# Patient Record
Sex: FEMALE | Race: WHITE | NOT HISPANIC OR LATINO | Employment: FULL TIME | URBAN - METROPOLITAN AREA
[De-identification: names, ages, dates, MRNs, and addresses within clinical notes are randomized per-mention and may not be internally consistent; named-entity substitution may affect disease eponyms.]

---

## 2018-02-13 ENCOUNTER — HOSPITAL ENCOUNTER (OUTPATIENT)
Dept: LAB | Facility: HOSPITAL | Age: 58
Discharge: HOME | End: 2018-02-13
Attending: INTERNAL MEDICINE | Admitting: INTERNAL MEDICINE

## 2018-02-13 LAB
ALBUMIN SERPL-MCNC: 4.1 G/DL (ref 3.5–4.8)
ALBUMIN/GLOB SERPL: 1.5 {RATIO} (ref 1–1.7)
ALP SERPL-CCNC: 48 IU/L (ref 32–91)
ALT SERPL-CCNC: 14 IU/L (ref 14–54)
ANION GAP SERPL CALC-SCNC: 9.6 MMOL/L (ref 10–20)
AST SERPL-CCNC: 16 IU/L (ref 15–41)
BILIRUB SERPL-MCNC: 0.5 MG/DL (ref 0.3–1.2)
BUN SERPL-MCNC: 10 MG/DL (ref 8–20)
BUN/CREAT SERPL: 12.5 (ref 5.4–26.2)
CALCIUM SERPL-MCNC: 9.5 MG/DL (ref 8.9–10.3)
CHLORIDE SERPL-SCNC: 105 MMOL/L (ref 101–111)
CONV CO2: 29 MMOL/L (ref 22–32)
CONV TOTAL PROTEIN: 6.9 G/DL (ref 6.1–7.9)
CREAT UR-MCNC: 0.8 MG/DL (ref 0.4–1)
GLOBULIN UR ELPH-MCNC: 2.8 G/DL (ref 2.5–3.8)
GLUCOSE SERPL-MCNC: 98 MG/DL (ref 65–99)
MAGNESIUM SERPL-MCNC: 2.1 MG/DL (ref 1.8–2.5)
POTASSIUM SERPL-SCNC: 3.6 MMOL/L (ref 3.6–5.1)
SODIUM SERPL-SCNC: 140 MMOL/L (ref 136–144)

## 2018-02-27 ENCOUNTER — HOSPITAL ENCOUNTER (OUTPATIENT)
Dept: CARDIOLOGY | Facility: HOSPITAL | Age: 58
Discharge: HOME | End: 2018-02-27
Attending: INTERNAL MEDICINE | Admitting: INTERNAL MEDICINE

## 2018-03-08 ENCOUNTER — HOSPITAL ENCOUNTER (OUTPATIENT)
Dept: MAMMOGRAPHY | Facility: HOSPITAL | Age: 58
Discharge: HOME | End: 2018-03-08
Attending: OBSTETRICS & GYNECOLOGY | Admitting: OBSTETRICS & GYNECOLOGY

## 2018-03-14 ENCOUNTER — HOSPITAL ENCOUNTER (OUTPATIENT)
Dept: FAMILY MEDICINE CLINIC | Facility: CLINIC | Age: 58
Setting detail: SPECIMEN
Discharge: HOME | End: 2018-03-14
Attending: FAMILY MEDICINE | Admitting: FAMILY MEDICINE

## 2018-08-09 ENCOUNTER — HOSPITAL ENCOUNTER (OUTPATIENT)
Dept: CT IMAGING | Facility: HOSPITAL | Age: 58
Discharge: HOME | End: 2018-08-09
Attending: FAMILY MEDICINE | Admitting: FAMILY MEDICINE

## 2018-08-14 ENCOUNTER — HOSPITAL ENCOUNTER (OUTPATIENT)
Dept: CT IMAGING | Facility: HOSPITAL | Age: 58
Discharge: HOME | End: 2018-08-14
Attending: FAMILY MEDICINE | Admitting: FAMILY MEDICINE

## 2018-09-17 ENCOUNTER — HOSPITAL ENCOUNTER (OUTPATIENT)
Dept: MRI IMAGING | Facility: HOSPITAL | Age: 58
Discharge: HOME | End: 2018-09-17
Attending: OBSTETRICS & GYNECOLOGY | Admitting: OBSTETRICS & GYNECOLOGY

## 2018-09-17 LAB — CREAT BLDA-MCNC: 0.9 MG/DL (ref 0.6–1.3)

## 2018-10-01 ENCOUNTER — HOSPITAL ENCOUNTER (OUTPATIENT)
Dept: CARDIOLOGY | Facility: HOSPITAL | Age: 58
Discharge: HOME | End: 2018-10-01
Attending: INTERNAL MEDICINE | Admitting: INTERNAL MEDICINE

## 2018-10-04 ENCOUNTER — HOSPITAL ENCOUNTER (OUTPATIENT)
Dept: NUCLEAR MEDICINE | Facility: HOSPITAL | Age: 58
Discharge: HOME | End: 2018-10-04
Attending: FAMILY MEDICINE | Admitting: FAMILY MEDICINE

## 2018-10-25 ENCOUNTER — HOSPITAL ENCOUNTER (OUTPATIENT)
Dept: MRI IMAGING | Facility: HOSPITAL | Age: 58
Discharge: HOME | End: 2018-10-25
Attending: FAMILY MEDICINE | Admitting: FAMILY MEDICINE

## 2018-11-01 ENCOUNTER — HOSPITAL ENCOUNTER (OUTPATIENT)
Dept: ORTHOPEDIC SURGERY | Facility: CLINIC | Age: 58
Discharge: HOME | End: 2018-11-01
Attending: ORTHOPAEDIC SURGERY | Admitting: ORTHOPAEDIC SURGERY

## 2018-11-08 ENCOUNTER — HOSPITAL ENCOUNTER (OUTPATIENT)
Dept: MRI IMAGING | Facility: HOSPITAL | Age: 58
Discharge: HOME | End: 2018-11-08
Attending: ORTHOPAEDIC SURGERY | Admitting: ORTHOPAEDIC SURGERY

## 2018-11-21 ENCOUNTER — HOSPITAL ENCOUNTER (OUTPATIENT)
Dept: PHYSICAL THERAPY | Facility: HOSPITAL | Age: 58
Setting detail: RECURRING SERIES
Discharge: HOME | End: 2018-12-31
Attending: ORTHOPAEDIC SURGERY | Admitting: ORTHOPAEDIC SURGERY

## 2019-03-18 ENCOUNTER — HOSPITAL ENCOUNTER (OUTPATIENT)
Dept: MAMMOGRAPHY | Facility: HOSPITAL | Age: 59
Discharge: HOME | End: 2019-03-18
Attending: OBSTETRICS & GYNECOLOGY | Admitting: OBSTETRICS & GYNECOLOGY

## 2019-10-21 ENCOUNTER — TELEPHONE (OUTPATIENT)
Dept: FAMILY MEDICINE CLINIC | Facility: CLINIC | Age: 59
End: 2019-10-21

## 2019-10-21 NOTE — TELEPHONE ENCOUNTER
Patient is wanting to be worked in on Thursday before 2pm.  When I asked her for the reason, she declined to tell me.  Said it has to do with Ag.  Can you work her in?

## 2019-10-24 ENCOUNTER — OFFICE VISIT (OUTPATIENT)
Dept: FAMILY MEDICINE CLINIC | Facility: CLINIC | Age: 59
End: 2019-10-24

## 2019-10-24 VITALS
HEART RATE: 53 BPM | BODY MASS INDEX: 26.63 KG/M2 | TEMPERATURE: 98.2 F | RESPIRATION RATE: 16 BRPM | SYSTOLIC BLOOD PRESSURE: 148 MMHG | OXYGEN SATURATION: 98 % | HEIGHT: 64 IN | DIASTOLIC BLOOD PRESSURE: 80 MMHG | WEIGHT: 156 LBS

## 2019-10-24 DIAGNOSIS — F43.21 COMPLICATED GRIEF: Primary | ICD-10-CM

## 2019-10-24 PROBLEM — R07.89 CHEST PAIN, ATYPICAL: Status: ACTIVE | Noted: 2018-02-13

## 2019-10-24 PROBLEM — F32.A DEPRESSION: Status: ACTIVE | Noted: 2018-02-13

## 2019-10-24 PROBLEM — K21.9 GERD (GASTROESOPHAGEAL REFLUX DISEASE): Status: ACTIVE | Noted: 2017-01-03

## 2019-10-24 PROBLEM — M15.9 POLYOSTEOARTHRITIS: Status: ACTIVE | Noted: 2018-08-09

## 2019-10-24 PROBLEM — R00.2 PALPITATIONS: Status: ACTIVE | Noted: 2017-01-03

## 2019-10-24 PROCEDURE — 99213 OFFICE O/P EST LOW 20 MIN: CPT | Performed by: FAMILY MEDICINE

## 2019-10-24 RX ORDER — ALPRAZOLAM 0.5 MG/1
0.5 TABLET ORAL EVERY 6 HOURS PRN
Qty: 50 TABLET | Refills: 2 | Status: SHIPPED | OUTPATIENT
Start: 2019-10-24 | End: 2021-11-29

## 2019-10-24 RX ORDER — ATENOLOL 25 MG/1
TABLET ORAL EVERY 24 HOURS
COMMUNITY
Start: 2018-12-04 | End: 2020-12-28

## 2019-10-24 RX ORDER — CITALOPRAM 10 MG/1
TABLET ORAL
Qty: 40 TABLET | Refills: 6 | Status: SHIPPED | OUTPATIENT
Start: 2019-10-24 | End: 2019-12-11

## 2019-10-24 RX ORDER — ACYCLOVIR 800 MG/1
TABLET ORAL
Refills: 0 | COMMUNITY
Start: 2019-10-14

## 2019-10-24 NOTE — PATIENT INSTRUCTIONS
Coping With Loss, Adult  People experience loss in many different ways throughout their lives. Events such as moving, changing jobs, and losing friends can create a sense of loss. The loss may be as serious as a major health change, divorce, death of a pet, or death of a loved one. All of these types of loss are likely to create a physical and emotional reaction known as grief. Grief is the result of a major change or an absence of something or someone that you count on. Grief is a normal reaction to loss.  How to recognize changes  A variety of factors can affect your grieving experience, including:  · The nature of your loss.  · Your relationship to what or whom you lost.  · Your understanding of grief and how to cope with it.  · Your support system.  The way that you deal with your grief will affect your ability to function as you normally do. When you are grieving, you may experience:  · Numbness, shock, sadness, anxiety, anger, denial, and guilt.  · Thoughts about death.  · Unexpected crying.  · A physical sensation of emptiness in your gut.  · Problems sleeping and eating.  · Fatigue.  · Loss of interest in normal activities.  · Dreaming about or imagining seeing the person who .  · A need to remember what or whom you lost.  · Difficulty thinking about anything other than your loss for a period of time.  · Relief. If you have been expecting the loss for a while, you may feel a sense of relief when it happens.  Where to find support  To get support for coping with loss:  · Ask your health care provider for help and recommendations, such as grief counseling or therapy.  · Think about joining a support group for people who are coping with loss.  Follow these instructions at home:    · Be patient with yourself and others. Allow the grieving process to happen, and remember that grieving takes time.  ? It is likely that you may never feel completely done with some grief. You may find a way to move on while still  cherishing memories and feelings about your loss.  ? Accepting your loss is a process. It can take months or longer to adjust.  · Express your feelings in healthy ways, such as:  ? Talking with others about your loss. It may be helpful to find others who have had a similar loss, such as a support group.  ? Writing down your feelings in a journal.  ? Doing physical activities to release stress and emotional energy.  ? Doing creative activities like painting, sculpting, or playing or listening to music.  ? Practicing resilience. This is the ability to recover and adjust after facing challenges. Reading some resources that encourage resilience may help you to learn ways to practice those behaviors.  · Keep to your normal routine as much as possible. If you have trouble focusing or doing normal activities, it is acceptable to take some time away from your normal routine.  · Spend time with friends and loved ones.  · Eat a healthy diet, get plenty of sleep, and rest when you feel tired.  Where to find more information  You can find more information about coping with loss from:  · American Society of Clinical Oncology: www.cancer.net  · American Psychological Association: www.apa.org  Contact a health care provider if:  · Your grief is extreme and keeps getting worse.  · You have ongoing grief that does not improve.  · Your body shows symptoms of grief, such as illness.  · You feel depressed, anxious, or lonely.  Get help right away if:  · You have thoughts about hurting yourself or others.  If you ever feel like you may hurt yourself or others, or have thoughts about taking your own life, get help right away. You can go to your nearest emergency department or call:  · Your local emergency services (911 in the [place]  · A suicide crisis helpline, such as the National Suicide Prevention Lifeline at 1-882.779.1822. This is open 24 hours a day.  Summary  · Grief is a normal part of experiencing a loss. It is the result of a  major change or an absence of something or someone that you count on.  · The depth of grief and the period of recovery depend on the type of loss as well as your ability to adjust to the change and process your feelings.  · Processing grief requires patience and a willingness to accept your feelings and talk about your loss with people who are supportive.  · It is important to find resources that work for you and to realize that we are all different when it comes to grief. There is not one single grieving process that works for everyone in the same way.  · Be aware that when grief becomes extreme, it can lead to more severe issues like isolation, depression, anxiety, or suicidal thoughts. Talk with your health care provider if you have any of these issues.  This information is not intended to replace advice given to you by your health care provider. Make sure you discuss any questions you have with your health care provider.  Document Released: 05/03/2018 Document Revised: 05/03/2018 Document Reviewed: 05/03/2018  [employer] Interactive Patient Education © 2019 [employer]  Complicated Grief  Grief is a normal response to the death of someone close to you. Feelings of fear, anger, and guilt can affect almost everyone who loses a loved one. It is also common to have symptoms of depression while you are grieving. These include problems with sleep, loss of appetite, and lack of energy. They may last for weeks or months after a loss.  Complicated grief is different from normal grief or depression. Normal grieving involves sadness and feelings of loss, but those feelings get better and heal over time. Complicated grief is a severe type of grief that lasts for a long time, usually for several months to a year or longer. It interferes with your ability to function normally. Complicated grief may require treatment from a mental health care provider.  What are the causes?  The cause of this condition is not known. It is not  clear why some people continue to struggle with grief and others do not.  What increases the risk?  You are more likely to develop this condition if:  · The death of your loved one was sudden or unexpected.  · The death of your loved one was due to a violent event.  · Your loved one  from suicide.  · Your loved one was a child or a young person.  · You were very close to your loved one, or you were dependent on him or her.  · You have a history of depression or anxiety.  What are the signs or symptoms?  Symptoms of this condition include:  · Feeling disbelief or having a lack of emotion (numbness).  · Being unable to enjoy good memories of your loved one.  · Needing to avoid anything or anyone that reminds you of your loved one.  · Being unable to stop thinking about the death.  · Feeling intense anger or guilt.  · Feeling alone and hopeless.  · Feeling that your life is meaningless and empty.  · Losing the desire to move on with your life.  How is this diagnosed?  This condition may be diagnosed based on:  · Your symptoms. Complicated grief will be diagnosed if you have ongoing symptoms of grief for 6-12 months or longer.  · The effect of symptoms on your life. You may be diagnosed with this condition if your symptoms are interfering with your ability to live your life.  Your health care provider may recommend that you see a mental health care provider. Many symptoms of depression are similar to the symptoms of complicated grief. It is important to be evaluated for complicated grief along with other mental health conditions.  How is this treated?  This condition is most commonly treated with talk therapy. This therapy is offered by a mental health specialist (psychiatrist). During therapy:  · You will learn healthy ways to cope with the loss of your loved one.  · Your mental health care provider may recommend antidepressant medicines.  Follow these instructions at home:  Lifestyle    · Take care of  yourself.  ? Eat on a regular basis, and maintain a healthy diet. Eat plenty of fruits, vegetables, lean protein, and whole grains.  ? Try to get some exercise each day. Aim for 30 minutes of exercise on most days of the week.  ? Keep a consistent sleep schedule. Try to get 8 or more hours of sleep each night.  ? Start doing the things that you used to enjoy.  · Do not use drugs or alcohol to ease your symptoms.  · Spend time with friends and loved ones.  General instructions  · Take over-the-counter and prescription medicines only as told by your health care provider.  · Consider joining a grief (bereavement) support group to help you deal with your loss.  · Keep all follow-up visits as told by your health care provider. This is important.  Contact a health care provider if:  · Your symptoms prevent you from functioning normally.  · Your symptoms do not get better with treatment.  Get help right away if:  · You have serious thoughts about hurting yourself or someone else.  · You have suicidal feelings.  If you ever feel like you may hurt yourself or others, or have thoughts about taking your own life, get help right away. You can go to your nearest emergency department or call:  · Your local emergency services (911 in the [place]  · A suicide crisis helpline, such as the National Suicide Prevention Lifeline at 1-594.324.8949. This is open 24 hours a day.  Summary  · Complicated grief is a severe type of grief that lasts for a long time. This grief is not likely to go away on its own. Get the help you need.  · Some griefs are more difficult than others and can cause this condition. You may need a certain type of treatment to help you recover if the loss of your loved one was sudden, violent, or due to suicide.  · You may feel guilty about moving on with your life. Getting help does not mean that you are forgetting your loved one. It means that you are taking care of yourself.  · Complicated grief is best treated with  talk therapy. Medicines may also be prescribed.  · Seek the help you need, and find support that will help you recover.  This information is not intended to replace advice given to you by your health care provider. Make sure you discuss any questions you have with your health care provider.  Document Released: 12/18/2006 Document Revised: 10/03/2018 Document Reviewed: 10/03/2018  [employer] Interactive Patient Education © 2019 [employer]

## 2019-10-27 PROBLEM — F43.21 COMPLICATED GRIEF: Status: ACTIVE | Noted: 2019-10-27

## 2019-10-27 NOTE — PROGRESS NOTES
Rooming Tab(CC,VS,Pt Hx,Fall Screen)  Chief Complaint   Patient presents with   • Depression       Subjective    Patient is here to discuss her severe grief/depression over the sudden loss of her  a few months ago.  Her  was a smoker and had an aneurysm in his aorta both in the thoracic and lumbar area.  Unfortunately he continued to smoke and was simply unable to stop.  Sondra came home from work one evening and found him down.  He had probably gone down several hours earlier and was dead on arrival.  She is obviously grief stricken.  She is very lonely.  The house is quiet.  The kids come over but they have trouble being in the same home that he was in.  Too many memories.  She is just wondering if there is anything at all that would help her get through this rough time            I have reviewed and updated her medications, medical history and problem list during today's office visit.     Patient Care Team:  Ezra Ramesh MD as PCP - General    Problem List Tab  Medications Tab  Synopsis Tab  Chart Review Tab  Care Everywhere Tab  Immunizations Tab  Patient History Tab    Social History     Tobacco Use   • Smoking status: Never Smoker   • Smokeless tobacco: Never Used   Substance Use Topics   • Alcohol use: No     Frequency: Never       Review of Systems   Constitutional: Positive for fatigue. Negative for diaphoresis and fever.   HENT: Negative.  Negative for congestion, hearing loss, sinus pressure, tinnitus and trouble swallowing.    Eyes: Negative.    Respiratory: Positive for chest tightness. Negative for cough, choking, shortness of breath and wheezing.    Cardiovascular: Positive for chest pain. Negative for palpitations.   Gastrointestinal: Negative.  Positive for indigestion. Negative for abdominal distention, abdominal pain and GERD.   Endocrine: Negative.    Genitourinary: Negative.  Negative for frequency.   Musculoskeletal: Negative.  Negative for arthralgias, gait problem, joint  "swelling, myalgias and neck stiffness.   Skin: Negative.  Negative for rash.   Allergic/Immunologic: Negative.  Negative for environmental allergies.   Neurological: Positive for headache. Negative for syncope and speech difficulty.   Hematological: Negative.  Negative for adenopathy.   Psychiatric/Behavioral: Positive for decreased concentration, dysphoric mood and sleep disturbance. Negative for stress.   All other systems reviewed and are negative.      Objective     Rooming Tab(CC,VS,Pt Hx,Fall Screen)  /80 (BP Location: Right arm, Cuff Size: Adult)   Pulse 53   Temp 98.2 °F (36.8 °C) (Oral)   Resp 16   Ht 162.6 cm (64\")   Wt 70.8 kg (156 lb)   SpO2 98%   BMI 26.78 kg/m²     Body mass index is 26.78 kg/m².    Physical Exam   Constitutional: She is oriented to person, place, and time. She appears well-developed and well-nourished.   HENT:   Head: Normocephalic and atraumatic.   Right Ear: External ear normal.   Left Ear: External ear normal.   Nose: Nose normal.   Mouth/Throat: Oropharynx is clear and moist. No oropharyngeal exudate.   Eyes: Conjunctivae and EOM are normal. Pupils are equal, round, and reactive to light. Right eye exhibits no discharge. Left eye exhibits no discharge. No scleral icterus.   Neck: Normal range of motion. Neck supple. No JVD present. No thyromegaly present.   Cardiovascular: Normal rate, regular rhythm, normal heart sounds and intact distal pulses.   No murmur heard.  Pulmonary/Chest: Effort normal and breath sounds normal. No respiratory distress. She has no wheezes. She has no rales. She exhibits no tenderness.   Abdominal: Soft. Bowel sounds are normal. She exhibits no distension. There is no tenderness.   Genitourinary: Rectal exam shows guaiac negative stool.   Musculoskeletal: Normal range of motion. She exhibits no edema, tenderness or deformity.   Lymphadenopathy:     She has no cervical adenopathy.   Neurological: She is alert and oriented to person, place, and " time.   Skin: Skin is warm and dry.   Psychiatric: She has a normal mood and affect. Her behavior is normal. Judgment and thought content normal.   Vitals reviewed.       Statin Choice Calculator  Data Reviewed:                   Assessment/Plan   Order Review Tab  Health Maintenance Tab  Patient Plan/Order Tab  Diagnoses and all orders for this visit:    1. Complicated grief (Primary)  Assessment & Plan:  Sondra is having a difficult time obviously with the loss of her .  She is very angry in the same time very sad.  She is angry because he did not take better care of himself and she sad that he is gone.  Her children and grandchildren are trying to help but they have their own grief to go through 2.  She has some friends but it is just a tough time all the way around.  She and her  had lost a son when he was a teenager from a accident that occurred at their home.  They had each other at the time to work through the grief and the pain of that loss.  Now she has no one.    We will try some Celexa starting low at 10 mg and slowly building up to may be 40 mg over the next few months.  I will also suggest that she go to grief counseling either at [facility] or possibly a counselor.  We gave her a name of several counselors close by that could help I think with her grief.  Hospice also offers grief counseling that she may be able to tap into.        Other orders  -     citalopram (CELEXA) 10 MG tablet; One daily for two weeks then two qd  Dispense: 40 tablet; Refill: 6  -     ALPRAZolam (XANAX) 0.5 MG tablet; Take 1 tablet by mouth Every 6 (Six) Hours As Needed for Anxiety.  Dispense: 50 tablet; Refill: 2      Wrapup Tab  Return in about 4 weeks (around 11/21/2019) for Recheck.

## 2019-10-27 NOTE — ASSESSMENT & PLAN NOTE
Sondra is having a difficult time obviously with the loss of her .  She is very angry in the same time very sad.  She is angry because he did not take better care of himself and she sad that he is gone.  Her children and grandchildren are trying to help but they have their own grief to go through 2.  She has some friends but it is just a tough time all the way around.  She and her  had lost a son when he was a teenager from a accident that occurred at their home.  They had each other at the time to work through the grief and the pain of that loss.  Now she has no one.    We will try some Celexa starting low at 10 mg and slowly building up to may be 40 mg over the next few months.  I will also suggest that she go to grief counseling either at [facility] or possibly a counselor.  We gave her a name of several counselors close by that could help I think with her grief.  Hospice also offers grief counseling that she may be able to tap into.

## 2019-12-11 ENCOUNTER — OFFICE VISIT (OUTPATIENT)
Dept: FAMILY MEDICINE CLINIC | Facility: CLINIC | Age: 59
End: 2019-12-11

## 2019-12-11 VITALS
TEMPERATURE: 98 F | RESPIRATION RATE: 12 BRPM | HEIGHT: 64 IN | WEIGHT: 152.4 LBS | BODY MASS INDEX: 26.02 KG/M2 | HEART RATE: 65 BPM | OXYGEN SATURATION: 97 % | SYSTOLIC BLOOD PRESSURE: 118 MMHG | DIASTOLIC BLOOD PRESSURE: 74 MMHG

## 2019-12-11 DIAGNOSIS — F43.21 COMPLICATED GRIEF: Primary | ICD-10-CM

## 2019-12-11 PROCEDURE — 99213 OFFICE O/P EST LOW 20 MIN: CPT | Performed by: FAMILY MEDICINE

## 2019-12-11 RX ORDER — CITALOPRAM 20 MG/1
TABLET ORAL
Qty: 30 TABLET | Refills: 6 | Status: SHIPPED | OUTPATIENT
Start: 2019-12-11 | End: 2020-06-08

## 2020-01-27 NOTE — PATIENT INSTRUCTIONS
Complicated Grief  Grief is a normal response to the death of someone close to you. Feelings of fear, anger, and guilt can affect almost everyone who loses a loved one. It is also common to have symptoms of depression while you are grieving. These include problems with sleep, loss of appetite, and lack of energy. They may last for weeks or months after a loss.  Complicated grief is different from normal grief or depression. Normal grieving involves sadness and feelings of loss, but those feelings get better and heal over time. Complicated grief is a severe type of grief that lasts for a long time, usually for several months to a year or longer. It interferes with your ability to function normally. Complicated grief may require treatment from a mental health care provider.  What are the causes?  The cause of this condition is not known. It is not clear why some people continue to struggle with grief and others do not.  What increases the risk?  You are more likely to develop this condition if:  · The death of your loved one was sudden or unexpected.  · The death of your loved one was due to a violent event.  · Your loved one  from suicide.  · Your loved one was a child or a young person.  · You were very close to your loved one, or you were dependent on him or her.  · You have a history of depression or anxiety.  What are the signs or symptoms?  Symptoms of this condition include:  · Feeling disbelief or having a lack of emotion (numbness).  · Being unable to enjoy good memories of your loved one.  · Needing to avoid anything or anyone that reminds you of your loved one.  · Being unable to stop thinking about the death.  · Feeling intense anger or guilt.  · Feeling alone and hopeless.  · Feeling that your life is meaningless and empty.  · Losing the desire to move on with your life.  How is this diagnosed?  This condition may be diagnosed based on:  · Your symptoms. Complicated grief will be diagnosed if you have  ongoing symptoms of grief for 6-12 months or longer.  · The effect of symptoms on your life. You may be diagnosed with this condition if your symptoms are interfering with your ability to live your life.  Your health care provider may recommend that you see a mental health care provider. Many symptoms of depression are similar to the symptoms of complicated grief. It is important to be evaluated for complicated grief along with other mental health conditions.  How is this treated?  This condition is most commonly treated with talk therapy. This therapy is offered by a mental health specialist (psychiatrist). During therapy:  · You will learn healthy ways to cope with the loss of your loved one.  · Your mental health care provider may recommend antidepressant medicines.  Follow these instructions at home:  Lifestyle    · Take care of yourself.  ? Eat on a regular basis, and maintain a healthy diet. Eat plenty of fruits, vegetables, lean protein, and whole grains.  ? Try to get some exercise each day. Aim for 30 minutes of exercise on most days of the week.  ? Keep a consistent sleep schedule. Try to get 8 or more hours of sleep each night.  ? Start doing the things that you used to enjoy.  · Do not use drugs or alcohol to ease your symptoms.  · Spend time with friends and loved ones.  General instructions  · Take over-the-counter and prescription medicines only as told by your health care provider.  · Consider joining a grief (bereavement) support group to help you deal with your loss.  · Keep all follow-up visits as told by your health care provider. This is important.  Contact a health care provider if:  · Your symptoms prevent you from functioning normally.  · Your symptoms do not get better with treatment.  Get help right away if:  · You have serious thoughts about hurting yourself or someone else.  · You have suicidal feelings.  If you ever feel like you may hurt yourself or others, or have thoughts about taking  your own life, get help right away. You can go to your nearest emergency department or call:  · Your local emergency services (911 in the [place]  · A suicide crisis helpline, such as the National Suicide Prevention Lifeline at 1-618.184.1420. This is open 24 hours a day.  Summary  · Complicated grief is a severe type of grief that lasts for a long time. This grief is not likely to go away on its own. Get the help you need.  · Some griefs are more difficult than others and can cause this condition. You may need a certain type of treatment to help you recover if the loss of your loved one was sudden, violent, or due to suicide.  · You may feel guilty about moving on with your life. Getting help does not mean that you are forgetting your loved one. It means that you are taking care of yourself.  · Complicated grief is best treated with talk therapy. Medicines may also be prescribed.  · Seek the help you need, and find support that will help you recover.  This information is not intended to replace advice given to you by your health care provider. Make sure you discuss any questions you have with your health care provider.  Document Released: 12/18/2006 Document Revised: 10/03/2018 Document Reviewed: 10/03/2018  [employer] Interactive Patient Education © 2019 [employer]

## 2020-01-27 NOTE — PROGRESS NOTES
Rooming Tab(CC,VS,Pt Hx,Fall Screen)  Chief Complaint   Patient presents with   • grief     f/u       Subjective    Sondra is a 59-year-old female who suddenly lost her  a few weeks ago.  She came home and found him down.  He has been dead for a while.  She is having a difficult time with the grieving process right now.  She has very few close friends that can support her.  Her kids and grandkids are going through their own grief and although that helps to be with them she herself needs her own help.  I have suggested grief groups and to call hospice to see if they can help her.  She will consider this but she is of the belief that this is something someone just has to work out on their own.  This is probably how she is going to handle this.         I have reviewed and updated her medications, medical history and problem list during today's office visit.     Patient Care Team:  Ezra Ramesh MD as PCP - General    Problem List Tab  Medications Tab  Synopsis Tab  Chart Review Tab  Care Everywhere Tab  Immunizations Tab  Patient History Tab    Social History     Tobacco Use   • Smoking status: Never Smoker   • Smokeless tobacco: Never Used   Substance Use Topics   • Alcohol use: No     Frequency: Never       Review of Systems   Constitutional: Positive for appetite change and fatigue. Negative for activity change, fever and unexpected weight loss.        Not eating well.   HENT: Negative for congestion, ear pain, hearing loss, postnasal drip, rhinorrhea, sinus pressure, sneezing, sore throat and swollen glands.    Eyes: Negative for blurred vision.   Respiratory: Negative for cough, shortness of breath and wheezing.    Cardiovascular: Negative for chest pain.   Gastrointestinal: Positive for constipation. Negative for abdominal pain, nausea and indigestion.   Genitourinary: Negative for dysuria, urgency and urinary incontinence.   Musculoskeletal: Positive for arthralgias, back pain and myalgias. Negative  "for joint swelling.   Skin: Negative for dry skin and skin lesions.   Allergic/Immunologic: Negative for environmental allergies.   Neurological: Positive for weakness. Negative for dizziness, headache, memory problem and confusion.   Hematological: Negative for adenopathy.   Psychiatric/Behavioral: Positive for decreased concentration, dysphoric mood, sleep disturbance and stress. Negative for agitation and depressed mood. The patient is not nervous/anxious.    All other systems reviewed and are negative.      Objective     Rooming Tab(CC,VS,Pt Hx,Fall Screen)  /74 (BP Location: Right arm, Patient Position: Sitting, Cuff Size: Adult)   Pulse 65   Temp 98 °F (36.7 °C) (Oral)   Resp 12   Ht 162.6 cm (64\")   Wt 69.1 kg (152 lb 6.4 oz)   SpO2 97%   BMI 26.16 kg/m²     Body mass index is 26.16 kg/m².    Physical Exam   Constitutional: She is oriented to person, place, and time. She appears well-developed and well-nourished.   HENT:   Head: Normocephalic and atraumatic.   Right Ear: External ear normal.   Left Ear: External ear normal.   Nose: Nose normal.   Mouth/Throat: Oropharynx is clear and moist. No oropharyngeal exudate.   Eyes: Pupils are equal, round, and reactive to light. Conjunctivae and EOM are normal. Right eye exhibits no discharge. Left eye exhibits no discharge. No scleral icterus.   Neck: Normal range of motion. Neck supple. No JVD present. No thyromegaly present.   Cardiovascular: Normal rate, regular rhythm, normal heart sounds and intact distal pulses.   No murmur heard.  Pulmonary/Chest: Effort normal and breath sounds normal. No respiratory distress. She has no wheezes. She has no rales. She exhibits no tenderness.   Abdominal: Soft. Bowel sounds are normal. She exhibits no distension. There is no tenderness.   Genitourinary: Rectal exam shows guaiac negative stool.   Musculoskeletal: Normal range of motion. She exhibits no edema, tenderness or deformity.   Lymphadenopathy:     She has " no cervical adenopathy.   Neurological: She is alert and oriented to person, place, and time.   Skin: Skin is warm and dry.   Psychiatric: Her behavior is normal. Judgment and thought content normal.   Patient's exam today is basically normal.  She has an appropriate amount of sadness and tearfulness but she is cognitively doing well and is answering questions appropriately.   Vitals reviewed.       Statin Choice Calculator  Data Reviewed:                   Assessment/Plan   Order Review Tab  Health Maintenance Tab  Patient Plan/Order Tab  Diagnoses and all orders for this visit:    1. Complicated grief (Primary)  Assessment & Plan:    Sondra is a 59-year-old female who suddenly lost her  a few weeks ago.  She came home and found him down.  He has been dead for a while.  She is having a difficult time with the grieving process right now.  She has very few close friends that can support her.  Her kids and grandkids are going through their own grief and although that helps to be with them she herself needs her own help.  I have suggested grief groups and to call hospice to see if they can help her.  She will consider this but she is of the belief that this is something someone just has to work out on their own.  This is probably how she is going to handle this.  Slow improvement in grief.  Continue Rx.    Continue citalopram daily and alprazolam as needed.      Other orders  -     citalopram (CeleXA) 20 MG tablet; One qd  Dispense: 30 tablet; Refill: 6      Wrapup Tab  Return in about 3 months (around 3/11/2020) for Recheck.

## 2020-06-08 RX ORDER — CITALOPRAM 20 MG/1
TABLET ORAL
Qty: 90 TABLET | Refills: 2 | Status: SHIPPED | OUTPATIENT
Start: 2020-06-08

## 2020-12-28 RX ORDER — ATENOLOL 25 MG/1
TABLET ORAL
Qty: 90 TABLET | Refills: 1 | Status: SHIPPED | OUTPATIENT
Start: 2020-12-28

## 2021-01-04 ENCOUNTER — LAB (OUTPATIENT)
Dept: FAMILY MEDICINE CLINIC | Facility: CLINIC | Age: 61
End: 2021-01-04

## 2021-01-04 ENCOUNTER — OFFICE VISIT (OUTPATIENT)
Dept: FAMILY MEDICINE CLINIC | Facility: CLINIC | Age: 61
End: 2021-01-04

## 2021-01-04 VITALS
TEMPERATURE: 97.3 F | SYSTOLIC BLOOD PRESSURE: 126 MMHG | DIASTOLIC BLOOD PRESSURE: 74 MMHG | WEIGHT: 151.2 LBS | HEART RATE: 66 BPM | BODY MASS INDEX: 25.95 KG/M2 | RESPIRATION RATE: 16 BRPM | OXYGEN SATURATION: 94 %

## 2021-01-04 DIAGNOSIS — R10.13 EPIGASTRIC ABDOMINAL PAIN: ICD-10-CM

## 2021-01-04 DIAGNOSIS — R10.13 EPIGASTRIC ABDOMINAL PAIN: Primary | ICD-10-CM

## 2021-01-04 PROCEDURE — 80053 COMPREHEN METABOLIC PANEL: CPT | Performed by: PHYSICIAN ASSISTANT

## 2021-01-04 PROCEDURE — 85025 COMPLETE CBC W/AUTO DIFF WBC: CPT | Performed by: PHYSICIAN ASSISTANT

## 2021-01-04 PROCEDURE — 83690 ASSAY OF LIPASE: CPT | Performed by: PHYSICIAN ASSISTANT

## 2021-01-04 PROCEDURE — 36415 COLL VENOUS BLD VENIPUNCTURE: CPT

## 2021-01-04 PROCEDURE — 99214 OFFICE O/P EST MOD 30 MIN: CPT | Performed by: PHYSICIAN ASSISTANT

## 2021-01-04 NOTE — PROGRESS NOTES
Subjective   Sondra Fonseca is a 60 y.o. female.     Chief Complaint   Patient presents with   • GI Problem       /74 (BP Location: Left arm, Patient Position: Sitting, Cuff Size: Adult)   Pulse 66   Temp 97.3 °F (36.3 °C) (Temporal)   Resp 16   Wt 68.6 kg (151 lb 3.2 oz)   SpO2 94%   BMI 25.95 kg/m²     BP Readings from Last 3 Encounters:   01/04/21 126/74   12/11/19 118/74   10/24/19 148/80       Wt Readings from Last 3 Encounters:   01/04/21 68.6 kg (151 lb 3.2 oz)   12/11/19 69.1 kg (152 lb 6.4 oz)   10/24/19 70.8 kg (156 lb)       HPI patient presents to the clinic for complaints of abdominal pain that has been present over the past 1 to 2 years but has progressively worsened.  She states the pain is worse after she eats and is epigastric in nature.  She states that she will sometimes have referred pain into her left shoulder blade.  She denies pain down her arm pain in her chest.  She denies shortness of air or continued nausea.  She denies fevers or chills.  She denies any previous work-up for this issue.  She denies dark-colored stools or blood in her stool.    The following portions of the patient's history were reviewed and updated as appropriate: allergies, current medications, past family history, past medical history, past social history, past surgical history and problem list.    Review of Systems   Constitutional: Negative for fatigue and fever.   Respiratory: Negative for cough and shortness of breath.    Cardiovascular: Negative for chest pain.   Gastrointestinal: Positive for abdominal pain (epigastric ), nausea and indigestion.   Genitourinary: Negative for dysuria.   Musculoskeletal: Negative for back pain and neck pain.   Skin: Negative for rash and bruise.   Neurological: Negative for weakness and headache.   Psychiatric/Behavioral: Negative for depressed mood. The patient is not nervous/anxious.        Objective   Physical Exam  Constitutional:       Appearance: Normal appearance.    Eyes:      Extraocular Movements: Extraocular movements intact.      Pupils: Pupils are equal, round, and reactive to light.   Cardiovascular:      Rate and Rhythm: Normal rate and regular rhythm.      Pulses: Normal pulses.      Heart sounds: Normal heart sounds.   Pulmonary:      Effort: Pulmonary effort is normal.      Breath sounds: Normal breath sounds.   Abdominal:      Tenderness: There is abdominal tenderness (ventral abdominal pain).      Hernia: No hernia is present.      Comments: Negative mcclure sign and negative mcburneys point.    Neurological:      General: No focal deficit present.      Mental Status: She is alert and oriented to person, place, and time.   Psychiatric:         Mood and Affect: Mood normal.         Behavior: Behavior normal.           Diagnoses and all orders for this visit:    1. Epigastric abdominal pain (Primary)  -     Comprehensive metabolic panel; Future  -     CBC w AUTO Differential; Future  -     CT Abdomen Pelvis With & Without Contrast; Future  -     Lipase; Future    Her pain is worse after she eats and she does have referred pain into her shoulder blade so this is concerning for gallbladder disease however her exam is not consistent with gallbladder disease and her pain is ventral and epigastric in nature and is very pinpoint.  At this point I am not sure exactly what is going on would prefer to move on with some imaging and some blood work today.  She has a follow-up with GI later this month and I asked her to keep this appointment.  Depending on the scan and blood work we will evaluate whether she needs a general surgery referral at that time.    Return in about 4 weeks (around 2/1/2021), or if symptoms worsen or fail to improve.

## 2021-01-05 ENCOUNTER — TELEPHONE (OUTPATIENT)
Dept: FAMILY MEDICINE CLINIC | Facility: CLINIC | Age: 61
End: 2021-01-05

## 2021-01-05 LAB
ALBUMIN SERPL-MCNC: 4.2 G/DL (ref 3.5–5.2)
ALBUMIN/GLOB SERPL: 1.4 G/DL
ALP SERPL-CCNC: 47 U/L (ref 39–117)
ALT SERPL W P-5'-P-CCNC: 10 U/L (ref 1–33)
ANION GAP SERPL CALCULATED.3IONS-SCNC: 10 MMOL/L (ref 5–15)
AST SERPL-CCNC: 17 U/L (ref 1–32)
BASOPHILS # BLD AUTO: 0.1 10*3/MM3 (ref 0–0.2)
BASOPHILS NFR BLD AUTO: 1.5 % (ref 0–1.5)
BILIRUB SERPL-MCNC: 0.3 MG/DL (ref 0–1.2)
BUN SERPL-MCNC: 12 MG/DL (ref 6–20)
BUN/CREAT SERPL: 13.5 (ref 7–25)
CALCIUM SPEC-SCNC: 9.5 MG/DL (ref 8.6–10.5)
CHLORIDE SERPL-SCNC: 103 MMOL/L (ref 98–107)
CO2 SERPL-SCNC: 28 MMOL/L (ref 22–29)
CREAT SERPL-MCNC: 0.89 MG/DL (ref 0.57–1)
DEPRECATED RDW RBC AUTO: 42.7 FL (ref 37–54)
EOSINOPHIL # BLD AUTO: 0.08 10*3/MM3 (ref 0–0.4)
EOSINOPHIL NFR BLD AUTO: 1.2 % (ref 0.3–6.2)
ERYTHROCYTE [DISTWIDTH] IN BLOOD BY AUTOMATED COUNT: 13 % (ref 12.3–15.4)
GFR SERPL CREATININE-BSD FRML MDRD: 65 ML/MIN/1.73
GLOBULIN UR ELPH-MCNC: 3 GM/DL
GLUCOSE SERPL-MCNC: 99 MG/DL (ref 65–99)
HCT VFR BLD AUTO: 42.2 % (ref 34–46.6)
HGB BLD-MCNC: 14.2 G/DL (ref 12–15.9)
IMM GRANULOCYTES # BLD AUTO: 0.02 10*3/MM3 (ref 0–0.05)
IMM GRANULOCYTES NFR BLD AUTO: 0.3 % (ref 0–0.5)
LIPASE SERPL-CCNC: 31 U/L (ref 13–60)
LYMPHOCYTES # BLD AUTO: 2.55 10*3/MM3 (ref 0.7–3.1)
LYMPHOCYTES NFR BLD AUTO: 37.9 % (ref 19.6–45.3)
MCH RBC QN AUTO: 30.5 PG (ref 26.6–33)
MCHC RBC AUTO-ENTMCNC: 33.6 G/DL (ref 31.5–35.7)
MCV RBC AUTO: 90.8 FL (ref 79–97)
MONOCYTES # BLD AUTO: 0.38 10*3/MM3 (ref 0.1–0.9)
MONOCYTES NFR BLD AUTO: 5.6 % (ref 5–12)
NEUTROPHILS NFR BLD AUTO: 3.6 10*3/MM3 (ref 1.7–7)
NEUTROPHILS NFR BLD AUTO: 53.5 % (ref 42.7–76)
NRBC BLD AUTO-RTO: 0 /100 WBC (ref 0–0.2)
PLATELET # BLD AUTO: 267 10*3/MM3 (ref 140–450)
PMV BLD AUTO: 9.9 FL (ref 6–12)
POTASSIUM SERPL-SCNC: 4.4 MMOL/L (ref 3.5–5.2)
PROT SERPL-MCNC: 7.2 G/DL (ref 6–8.5)
RBC # BLD AUTO: 4.65 10*6/MM3 (ref 3.77–5.28)
SODIUM SERPL-SCNC: 141 MMOL/L (ref 136–145)
WBC # BLD AUTO: 6.73 10*3/MM3 (ref 3.4–10.8)

## 2021-01-05 NOTE — TELEPHONE ENCOUNTER
----- Message from Damion Larry PA-C sent at 1/5/2021  1:45 PM EST -----  Liver enzymes and alk phos normal. Continue with ct scan

## 2021-01-08 ENCOUNTER — TELEPHONE (OUTPATIENT)
Dept: FAMILY MEDICINE CLINIC | Facility: CLINIC | Age: 61
End: 2021-01-08

## 2021-01-08 NOTE — TELEPHONE ENCOUNTER
Caller: Sondra Fonseca    Relationship: Self    Best call back number:[phone]      What test was performed:CT ABDOMEN/PELVIS    When was the test performed: 01/0/421    Where was the test performed: [facility]

## 2021-01-12 ENCOUNTER — TELEPHONE (OUTPATIENT)
Dept: FAMILY MEDICINE CLINIC | Facility: CLINIC | Age: 61
End: 2021-01-12

## 2021-01-12 DIAGNOSIS — R10.13 EPIGASTRIC ABDOMINAL PAIN: ICD-10-CM

## 2021-01-12 NOTE — TELEPHONE ENCOUNTER
PATIENT CALLED AGAIN REQUESTING CT RESULTS. PATIENT IS CONFUSED BECAUSE SHANTA LAYTON IS THE ONE WHO ORDERED THESE. PATIENT IS REQUESTING A CALL BACK REGARDING WHAT NEEDS TO HAPPEN TO GET RESULTS OVER TO OFFICE    PLEASE CALL BACK AND ADVISE  CB#: [phone]

## 2021-01-12 NOTE — TELEPHONE ENCOUNTER
----- Message from Damion Larry PA-C sent at 1/12/2021  1:20 PM EST -----  The CT scan looks good. No signs of gallstones or any acute abdominal process. Was she already scheduled for GI appointment?

## 2021-11-29 DIAGNOSIS — F41.9 ANXIETY: Primary | ICD-10-CM

## 2021-11-29 RX ORDER — ALPRAZOLAM 0.5 MG/1
TABLET ORAL
Qty: 50 TABLET | Refills: 1 | Status: SHIPPED | OUTPATIENT
Start: 2021-11-29 | End: 2022-10-26

## 2022-10-26 DIAGNOSIS — F41.9 ANXIETY: ICD-10-CM

## 2022-10-26 RX ORDER — ALPRAZOLAM 0.5 MG/1
TABLET ORAL
Qty: 50 TABLET | Refills: 1 | Status: SHIPPED | OUTPATIENT
Start: 2022-10-26 | End: 2022-11-25

## 2022-11-09 ENCOUNTER — TELEPHONE (OUTPATIENT)
Dept: FAMILY MEDICINE CLINIC | Facility: CLINIC | Age: 62
End: 2022-11-09

## 2022-11-09 DIAGNOSIS — F41.9 ANXIETY: ICD-10-CM

## 2022-11-09 RX ORDER — ALPRAZOLAM 0.5 MG/1
0.5 TABLET ORAL EVERY 6 HOURS PRN
Qty: 50 TABLET | Refills: 1 | Status: CANCELLED | OUTPATIENT
Start: 2022-11-09 | End: 2022-12-09

## 2022-11-09 NOTE — TELEPHONE ENCOUNTER
Caller: Sondra Fonseca    Relationship to patient: Self    Best call back number: [phone]    Patient is needing: PATIENT CALLED AND SAID THAT SHE TRIED TO  HER PRESCRIPTION FOR ALPRAZOLAM (XANAX) AND THE PHARMACY TOLD HER THAT SHE NEEDED TO CONTACT THE DOCTOR ABOUT GETTING A DIFFERENT MEDICATION PRESCRIBED    SHE IS GOING TO VERIFY WITH [facility] WHAT THEY ARE NEEDING AND CALLBACK

## 2022-11-09 NOTE — TELEPHONE ENCOUNTER
Caller: Sondra Fonseca    Relationship: Self    Best call back number: [phone]    What was the call regarding: PATIENT STATES THAT SHE SPOKE WITH THE PHARMACY AND GOT EVERYTHING FIGURED OUT.

## 2023-04-27 DIAGNOSIS — F41.9 ANXIETY: ICD-10-CM

## 2023-04-29 RX ORDER — ALPRAZOLAM 0.5 MG/1
TABLET ORAL
Qty: 50 TABLET | Refills: 1 | Status: SHIPPED | OUTPATIENT
Start: 2023-04-29 | End: 2023-05-29

## 2023-09-26 ENCOUNTER — TRANSCRIBE ORDERS (OUTPATIENT)
Dept: ADMINISTRATIVE | Facility: HOSPITAL | Age: 63
End: 2023-09-26
Payer: COMMERCIAL

## 2023-09-26 DIAGNOSIS — Z12.31 ENCOUNTER FOR SCREENING MAMMOGRAM FOR MALIGNANT NEOPLASM OF BREAST: Primary | ICD-10-CM

## 2023-09-26 DIAGNOSIS — Z78.0 POST-MENOPAUSAL: ICD-10-CM

## 2023-11-06 ENCOUNTER — HOSPITAL ENCOUNTER (OUTPATIENT)
Dept: MAMMOGRAPHY | Facility: HOSPITAL | Age: 63
Discharge: HOME | End: 2023-11-06
Admitting: NURSE PRACTITIONER
Payer: COMMERCIAL

## 2023-11-06 ENCOUNTER — HOSPITAL ENCOUNTER (OUTPATIENT)
Dept: BONE DENSITY | Facility: HOSPITAL | Age: 63
Discharge: HOME | End: 2023-11-06
Payer: COMMERCIAL

## 2023-11-06 DIAGNOSIS — Z12.31 ENCOUNTER FOR SCREENING MAMMOGRAM FOR MALIGNANT NEOPLASM OF BREAST: ICD-10-CM

## 2023-11-06 DIAGNOSIS — Z78.0 POST-MENOPAUSAL: ICD-10-CM

## 2023-11-06 PROCEDURE — 77080 DXA BONE DENSITY AXIAL: CPT

## 2023-11-06 PROCEDURE — 77063 BREAST TOMOSYNTHESIS BI: CPT

## 2023-11-06 PROCEDURE — 77067 SCR MAMMO BI INCL CAD: CPT
